# Patient Record
Sex: MALE | Race: WHITE | NOT HISPANIC OR LATINO | URBAN - METROPOLITAN AREA
[De-identification: names, ages, dates, MRNs, and addresses within clinical notes are randomized per-mention and may not be internally consistent; named-entity substitution may affect disease eponyms.]

---

## 2021-08-18 ENCOUNTER — WEB ENCOUNTER (OUTPATIENT)
Dept: URBAN - METROPOLITAN AREA CLINIC 40 | Facility: CLINIC | Age: 46
End: 2021-08-18

## 2021-08-18 ENCOUNTER — LAB OUTSIDE AN ENCOUNTER (OUTPATIENT)
Dept: URBAN - METROPOLITAN AREA CLINIC 74 | Facility: CLINIC | Age: 46
End: 2021-08-18

## 2021-08-18 ENCOUNTER — WEB ENCOUNTER (OUTPATIENT)
Dept: URBAN - METROPOLITAN AREA CLINIC 74 | Facility: CLINIC | Age: 46
End: 2021-08-18

## 2021-08-18 ENCOUNTER — OFFICE VISIT (OUTPATIENT)
Dept: URBAN - METROPOLITAN AREA CLINIC 74 | Facility: CLINIC | Age: 46
End: 2021-08-18
Payer: OTHER GOVERNMENT

## 2021-08-18 DIAGNOSIS — K21.9 GERD WITHOUT ESOPHAGITIS: ICD-10-CM

## 2021-08-18 DIAGNOSIS — K44.9 HIATAL HERNIA: ICD-10-CM

## 2021-08-18 DIAGNOSIS — Z12.11 COLON CANCER SCREENING: ICD-10-CM

## 2021-08-18 DIAGNOSIS — K57.90 DIVERTICULOSIS: ICD-10-CM

## 2021-08-18 PROBLEM — 266435005: Status: ACTIVE | Noted: 2021-08-18

## 2021-08-18 PROCEDURE — 99244 OFF/OP CNSLTJ NEW/EST MOD 40: CPT | Performed by: INTERNAL MEDICINE

## 2021-08-18 RX ORDER — LISINOPRIL 2.5 MG/1
1 TABLET TABLET ORAL ONCE A DAY
Status: ACTIVE | COMMUNITY

## 2021-08-18 RX ORDER — CARVEDILOL 3.12 MG/1
1 TABLET WITH FOOD TABLET, FILM COATED ORAL TWICE A DAY
Status: ACTIVE | COMMUNITY

## 2021-08-18 NOTE — HPI-TODAY'S VISIT:
Pt is referred by Dr. Matt De La Fuente. A copy of this note will be provided for review. Seen in ER a few weeks ago for acute diverticulitis. WBC 12.1, Cr 1.22, otherwise cbc/cmp/lipase normal.  CT below, pandiverticulosis with sigmoid diverticulitis. He was treated with Augmentin 14 days and now f/u with GI. Hx viral cardiomyopathy, followed by cardiology and recent f/u negative, EF 60%, no overt CHF. ---- EXAM:  PH CT ABDOMEN/PELVIS WITH IV CONTRAST(CREATININE DRAW IF NEEDED)   CLINICAL INDICATION:  abd pain llq   TECHNIQUE: Following IV administration of 105 cc of Omnipaque 350, CT scan of the abdomen and pelvis with multiplanar reformatted images generated from the data set. Dose reduction techniques were utilized.    COMPARISON:  10/20/2016   FINDINGS:    Lower chest:  No evidence of abnormality.   Liver:  Normal in appearance.   Gallbladder:  Normal.    Pancreas: Normal.    Spleen:  Normal.   Adrenals:  Normal.   Kidneys:  Normal.    Gallbladder: Unremarkable.   Gastrointestinal:  Tiny hiatal hernia. Small bowel loops are nondistended. Pancolonic diverticulosis. There is some focal wall thickening affecting the proximal sigmoid colon. Some stranding is noted in the left lower quadrant, outlining an epiploic  appendage. There is no free air or fluid collection.  The appendix is normal in appearance.   Pelvis:  Prostate and seminal vesicles are normal.   Vasculature:  Abdominal and pelvic vasculature appears normal.   Other: Inguinal and umbilical fat-containing hernias. Stranding in the pelvis.   Lymph nodes:  No enlarged lymph nodes.    Bones:  No destructive osseous lesion.   IMPRESSION: .         .   There is pancolonic diverticulosis. There is stranding adjacent to the sigmoid colon. There may be focal sigmoid colonic wall thickening. There is fluid outlining an epiploic appendage. This is probably due to focal acute diverticulitis although epiploic  appendagitis is also considered as a secondary process. After resolution of acute symptoms, recommend direct visualization by colonoscopy if not recently performed to exclude underlying neoplasm as the etiology for colonic inflammation.   The Results Reporting Office (F1) will complete appropriate follow-up actions based on defined processes. F1   Released By: JAMIR DALEY MD 7/22/2021 12:40 A

## 2021-08-24 PROBLEM — 397881000: Status: ACTIVE | Noted: 2021-08-18

## 2021-10-05 ENCOUNTER — OFFICE VISIT (OUTPATIENT)
Dept: URBAN - METROPOLITAN AREA SURGERY CENTER 30 | Facility: SURGERY CENTER | Age: 46
End: 2021-10-05
Payer: OTHER GOVERNMENT

## 2021-10-05 DIAGNOSIS — D12.2 ADENOMA OF ASCENDING COLON: ICD-10-CM

## 2021-10-05 DIAGNOSIS — K63.89 BACTERIAL OVERGROWTH SYNDROME: ICD-10-CM

## 2021-10-05 PROCEDURE — 45380 COLONOSCOPY AND BIOPSY: CPT | Performed by: INTERNAL MEDICINE

## 2021-10-05 PROCEDURE — G8907 PT DOC NO EVENTS ON DISCHARG: HCPCS | Performed by: INTERNAL MEDICINE

## 2021-10-05 PROCEDURE — 45385 COLONOSCOPY W/LESION REMOVAL: CPT | Performed by: INTERNAL MEDICINE

## 2021-10-07 ENCOUNTER — TELEPHONE ENCOUNTER (OUTPATIENT)
Dept: URBAN - METROPOLITAN AREA CLINIC 40 | Facility: CLINIC | Age: 46
End: 2021-10-07

## 2021-10-20 ENCOUNTER — OFFICE VISIT (OUTPATIENT)
Dept: URBAN - METROPOLITAN AREA CLINIC 74 | Facility: CLINIC | Age: 46
End: 2021-10-20

## 2021-10-20 RX ORDER — CARVEDILOL 3.12 MG/1
1 TABLET WITH FOOD TABLET, FILM COATED ORAL TWICE A DAY
Status: ACTIVE | COMMUNITY

## 2021-10-20 RX ORDER — LISINOPRIL 2.5 MG/1
1 TABLET TABLET ORAL ONCE A DAY
Status: ACTIVE | COMMUNITY

## 2024-02-26 PROBLEM — 307496006: Status: ACTIVE | Noted: 2024-02-26

## 2024-02-26 PROBLEM — 428283002: Status: ACTIVE | Noted: 2024-02-26

## 2024-02-27 ENCOUNTER — OV EP (OUTPATIENT)
Dept: URBAN - METROPOLITAN AREA CLINIC 74 | Facility: CLINIC | Age: 49
End: 2024-02-27
Payer: OTHER GOVERNMENT

## 2024-02-27 ENCOUNTER — LAB (OUTPATIENT)
Dept: URBAN - METROPOLITAN AREA CLINIC 74 | Facility: CLINIC | Age: 49
End: 2024-02-27

## 2024-02-27 VITALS
HEART RATE: 69 BPM | BODY MASS INDEX: 34.89 KG/M2 | TEMPERATURE: 97.2 F | HEIGHT: 65 IN | DIASTOLIC BLOOD PRESSURE: 86 MMHG | WEIGHT: 209.4 LBS | SYSTOLIC BLOOD PRESSURE: 116 MMHG

## 2024-02-27 DIAGNOSIS — R10.32 LLQ ABDOMINAL PAIN: ICD-10-CM

## 2024-02-27 DIAGNOSIS — K57.90 DIVERTICULOSIS: ICD-10-CM

## 2024-02-27 DIAGNOSIS — R10.824 LEFT LOWER QUADRANT ABDOMINAL TENDERNESS WITH REBOUND TENDERNESS: ICD-10-CM

## 2024-02-27 DIAGNOSIS — Z86.010 PERSONAL HISTORY OF COLONIC POLYPS: ICD-10-CM

## 2024-02-27 DIAGNOSIS — K57.92 DIVERTICULITIS: ICD-10-CM

## 2024-02-27 PROCEDURE — 99213 OFFICE O/P EST LOW 20 MIN: CPT | Performed by: PHYSICIAN ASSISTANT

## 2024-02-27 RX ORDER — CARVEDILOL 3.12 MG/1
1 TABLET WITH FOOD TABLET, FILM COATED ORAL TWICE A DAY
Status: ACTIVE | COMMUNITY

## 2024-02-27 RX ORDER — POLYETHYLENE GLYCOL 3350 17 G/17G
AS DIRECTED POWDER, FOR SOLUTION ORAL
OUTPATIENT
Start: 2024-02-27

## 2024-02-27 RX ORDER — LISINOPRIL 2.5 MG/1
1 TABLET TABLET ORAL ONCE A DAY
Status: ACTIVE | COMMUNITY

## 2024-02-27 RX ORDER — CIPROFLOXACIN HYDROCHLORIDE 500 MG/1
1 TABLET TABLET, FILM COATED ORAL
Qty: 8 TABLET | Refills: 0 | OUTPATIENT
Start: 2024-02-27 | End: 2024-03-02

## 2024-02-27 RX ORDER — METRONIDAZOLE 500 MG/1
1 TABLET TABLET ORAL THREE TIMES A DAY
Qty: 12 TABLET | Refills: 0 | OUTPATIENT
Start: 2024-02-27 | End: 2024-03-02

## 2024-02-27 NOTE — PHYSICAL EXAM HENT:
Head, normocephalic, atraumatic, Face, Face within normal limits, Ears, External ears within normal limits
patient representative

## 2024-02-27 NOTE — PHYSICAL EXAM GASTROINTESTINAL
Abdomen , soft, tender at LLQ and periumbilical region, distended , no guarding or rigidity , no masses palpable , normal bowel sounds , Liver and Spleen,  no hepatosplenomegaly , liver nontender

## 2024-02-27 NOTE — HPI-TODAY'S VISIT:
The patient is 48-year-old male with past medical history as noted below known to Dr. Addison is presenting to our clinic today with diverticulitis.  The patient was seen in ED at  on 02/26/2024 with left lower quadrant abdominal pain with diagnosis of diverticulitis on CT imaging.  He was treated with Cipro 500 mg 1 tablet every 12 hours and Flagyl 500 mg 1 tablet every 8 hours for 10 days therapy.  He was also given hydrocodone and Zofran as needed for pain and nausea. He is doing better but still some LLQ abdominal pain. No other GI issues daily.   -- The patient denies dyspepsia, dysphagia, odynophagia, hemoptysis, hematemesis, nausea, vomiting, regurgitation, melena, constipation, diarrhea,  hematochezia, fever, chills, chest pain, SOB, or any other GI complaints today.   -- The patient denies ETOH, Tobacco, and Illicit drug use.   -- The patient is up to date with Flu and COVID vaccine.  -- Denies NSAID's.   Diagnostic studies: -- Labs on 02/26/2024 CBC with WBC 14.4, hemoglobin 14.0, hematocrit 42.7, and platelet 381.  BMP with sodium 139, potassium 4.1, BUN 14, and creatinine 0.9.  UA unremarkable.  -- CT scan of abdomen and pelvis with IV contrast on 062/26/2024 noted diverticulitis involving the sigmoid colon.  No drainable fluid collection or abscess.  Liver, spleen, pancreas, adrenal glands, and kidneys are normal.  Procedure: -- Colonoscopy with polypectomy on 10/05/2021 by Dr. Addison noted lipomatous ileocecal valve.  The examined portion of the ileum was normal. One 4 mm polyp in the ascending colon, removed with a cold snare.  Resected and retrieved.  Diverticulosis in sigmoid colon and in the descending colon.  Non-bleeding internal hemorrhoids.  Repeat colonoscopy in 5 years for surveillance.  Biopsy multiple fragments of tubular adenoma.  Ileum biopsy with enteric mucosa with mild active inflammation.

## 2024-03-14 ENCOUNTER — LAB (OUTPATIENT)
Dept: URBAN - METROPOLITAN AREA CLINIC 74 | Facility: CLINIC | Age: 49
End: 2024-03-14

## 2024-04-05 ENCOUNTER — COLON (OUTPATIENT)
Dept: URBAN - METROPOLITAN AREA SURGERY CENTER 30 | Facility: SURGERY CENTER | Age: 49
End: 2024-04-05
Payer: OTHER GOVERNMENT

## 2024-04-05 DIAGNOSIS — Z87.19 H/O DIVERTICULITIS OF COLON: ICD-10-CM

## 2024-04-05 PROCEDURE — 45378 DIAGNOSTIC COLONOSCOPY: CPT | Performed by: INTERNAL MEDICINE

## 2024-04-30 ENCOUNTER — OV EP (OUTPATIENT)
Dept: URBAN - METROPOLITAN AREA CLINIC 74 | Facility: CLINIC | Age: 49
End: 2024-04-30